# Patient Record
(demographics unavailable — no encounter records)

---

## 2024-11-14 NOTE — REASON FOR VISIT
[Structural Heart and Valve Disease] : structural heart and valve disease [FreeTextEntry3] : Sim [FreeTextEntry1] : Mr. Shirley is referred for evaluation of his aortic stenosis. He is an 82y/o male with a medical history significant for HTN, HLD, and CAD s/p CABGx5 on 11/6/2015 by Dr. Barba. He had an echocardiogram in January which showed moderate AS. Of note, EKG on 12/21/2023 demonstrated bifascicular block. Followed and referred by Dr. Montemayor.

## 2024-11-18 NOTE — REASON FOR VISIT
[FreeTextEntry2] : lower back pain after playing golf in [facility] on 11/1/24

## 2024-11-18 NOTE — ASSESSMENT
[FreeTextEntry1] : 83 yo male presents today for eval of his low back pain. CT from [place] reviewed shows compression fractures as well as spondylolisthesis L4-5. Patient has had numerous medications with minimal relief. Recommend proceeding with MRI as patient will need injection given persistent pain and likely stenosis.   - Patient given prescription for MRI, follow up after study is completed to discuss results.   - Referral to pain management for lumbar ALEXEY, Dr. Anthony.   - Recommend NSAIDs PRN - Recommend heating pad use to decrease muscle spasm - Discussed the importance of home exercises, including but not limited to hamstring stretching and core strengthening   Patient was educated on their diagnosis today. All questions answered and patient expressed understanding.  F/U after MRI

## 2024-11-18 NOTE — HISTORY OF PRESENT ILLNESS
[de-identified] : Body Part: lower back Length of symptoms/ DOI: 11/1/24 Cause of accident/ injury: playing golf in [facility] Radicular symptoms: none Quality of pain: sharp, stabbing, aching, spasms Pain at Rest:  7-10/10 Pain with activity/ walking:  10/10 Pain worsens with: sitting, standing, walking, sleeping, exercising Pain improves with: N/A Previous treatments: [facility] 11/5/24- Prednisone and Methocarbamol 500mg, After Hours clinic-Baclofen 20mg [facility]- IV Dilaudid and Morphine [facility] 11/8/24-Hydrocodone- Acetaminophen Returned to [facility] 11/11/24- Depo-Metro 40mg injection at L[facility] 11/17/24- Valium 5mg and Tylenol 1,000mg Recent Imaging: x-ray and CT in [facility], has reports only  Current treatments: TENs unit Current medications for pain: Valium 5mg and Tylenol 1,000mg Work status/ occupation: retired Assisted walking device: none

## 2024-11-22 NOTE — PHYSICAL EXAM
[Well Developed] : well developed [Well Nourished] : well nourished [No Acute Distress] : no acute distress [Normal Conjunctiva] : normal conjunctiva [Normal Venous Pressure] : normal venous pressure [No Carotid Bruit] : no carotid bruit [Normal S1, S2] : normal S1, S2 [No Rub] : no rub [No Gallop] : no gallop [Clear Lung Fields] : clear lung fields [Good Air Entry] : good air entry [No Respiratory Distress] : no respiratory distress  [Soft] : abdomen soft [Non Tender] : non-tender [No Masses/organomegaly] : no masses/organomegaly [Normal Bowel Sounds] : normal bowel sounds [Normal Gait] : normal gait [No Edema] : no edema [No Cyanosis] : no cyanosis [No Clubbing] : no clubbing [No Varicosities] : no varicosities [No Rash] : no rash [No Skin Lesions] : no skin lesions [Moves all extremities] : moves all extremities [No Focal Deficits] : no focal deficits [Normal Speech] : normal speech [Alert and Oriented] : alert and oriented [Normal memory] : normal memory [de-identified] : 3/6 systolic murmur, mid peaking. Normal S2

## 2024-11-22 NOTE — DISCUSSION/SUMMARY
[FreeTextEntry1] : CAD- No change in meds.  No angina  HTN- stable - c/w current meds Hchol- no change in meds  AV- mod-severe by exam.   Carlos remains stable from a cardiovascular perspective and may proceed with his planned kyphoplasty procedure. No additional testing is required. (Nuc stress test 3/24 without ischemia - ef 60%)   RTO in 4 mo  [EKG obtained to assist in diagnosis and management of assessed problem(s)] : EKG obtained to assist in diagnosis and management of assessed problem(s)

## 2024-11-22 NOTE — HISTORY OF PRESENT ILLNESS
[FreeTextEntry1] : Returning to the office for f/u  Planning on kyphoplasty Dealing with back pain No LE edema No syncope or near syncope No CP  Echo from [place] with LOAN 0.8 (very different from LOAN 1.1 earlier this year)

## 2024-11-22 NOTE — PHYSICAL EXAM
[Well Developed] : well developed [Well Nourished] : well nourished [No Acute Distress] : no acute distress [Normal Conjunctiva] : normal conjunctiva [Normal Venous Pressure] : normal venous pressure [No Carotid Bruit] : no carotid bruit [Normal S1, S2] : normal S1, S2 [No Rub] : no rub [No Gallop] : no gallop [Clear Lung Fields] : clear lung fields [Good Air Entry] : good air entry [No Respiratory Distress] : no respiratory distress  [Soft] : abdomen soft [Non Tender] : non-tender [No Masses/organomegaly] : no masses/organomegaly [Normal Bowel Sounds] : normal bowel sounds [Normal Gait] : normal gait [No Edema] : no edema [No Cyanosis] : no cyanosis [No Clubbing] : no clubbing [No Varicosities] : no varicosities [No Rash] : no rash [No Skin Lesions] : no skin lesions [Moves all extremities] : moves all extremities [No Focal Deficits] : no focal deficits [Normal Speech] : normal speech [Alert and Oriented] : alert and oriented [Normal memory] : normal memory [de-identified] : 3/6 systolic murmur, mid peaking. Normal S2

## 2024-12-02 NOTE — PHYSICAL EXAM
[No Acute Distress] : no acute distress [Well Nourished] : well nourished [Well Developed] : well developed [Normal Appearance] : was normal in appearance [Neck Supple] : was supple [No Respiratory Distress] : no respiratory distress  [Clear to Auscultation] : lungs were clear to auscultation bilaterally [Normal Rate] : normal rate  [Regular Rhythm] : with a regular rhythm [Normal S1, S2] : normal S1 and S2 [Alert and Oriented x3] : oriented to person, place, and time [de-identified] : + systolic murmur  [de-identified] : + sore to sacral region with surrounding erythema

## 2024-12-02 NOTE — HISTORY OF PRESENT ILLNESS
[Spouse] : spouse [FreeTextEntry1] : Follow up  [de-identified] : 82 year old male presents for a follow up. He developed sudden back pain about 4 and 1/2 weeks while golfing in [place]. He had been evaluated in [place] but ultimately came to [place] for treatment. He had been diagnosed with L1 compression fracture- had kyphoplasty on 11/26. He followed up with pain management today as he had developed pain to his left gluteal region. He received 2 injections today- to his sacral iliac joint and a left cluneal nerve block. He has developed a sore to his sacral region- was noticed about 2 weeks ago. His significant other has been attending to the sore- applying antibiotic ointment.   He has h/o aortic stenosis, HTN, Hyperlipidemia, CAD s/p CABG  following with cardiology  on ASA, Plavix, Rosuvastatin, Zetia, Nebivolol, Lisinopril   Has CMML, anemia- following with hematology/oncology  Has GERD- on Pantoprazole

## 2024-12-09 NOTE — PHYSICAL EXAM
[Alert and Oriented x3] : oriented to person, place, and time [No Acute Distress] : no acute distress [Well Developed] : well developed [Normal Appearance] : was normal in appearance [Neck Supple] : was supple [No Respiratory Distress] : no respiratory distress  [No Accessory Muscle Use] : no accessory muscle use [de-identified] : + tenderness to left groin, no palpable mass  [de-identified] : + sore to sacral region with surrounding erythema

## 2024-12-09 NOTE — HISTORY OF PRESENT ILLNESS
[FreeTextEntry8] : 82 year old male presents alongside his significant other c/o worsening left sided groin pain. He states the pain worsened after he received the injections by pain management for his lower back pain. He c/o pain to his left lower back and left groin. He denies any palpable lump to his groin. He is currently prescribed Oxycodone for his pain which has not been helping. He has been tried on Tizanidine and Baclofen previously w/o improvement. He has h/o BPH- concerned this could be contributing to his symptoms. He has h/o urinary retention but he has been urinating, denies any testicular swelling. He would like a referral for a urologist. He also continues to have a sore to his sacral region- has been taking Cephalexin, having Desitin cream applied to area. His significant other reports improvement to the area.

## 2024-12-09 NOTE — REVIEW OF SYSTEMS
[Fever] : no fever [Chills] : no chills [Chest Pain] : no chest pain [FreeTextEntry9] : as per HPI  [de-identified] : as per HPI

## 2024-12-09 NOTE — ASSESSMENT
[FreeTextEntry1] : trial of Diazepam at bedtime,  reviewed, aware of drowsy side effects hold Oxycodone- discuss pain with pain management referred to urology- has h/o BPH, c/o lower urinary stream as pressure ulcer still present with surrounding erythema, changed antibiotics to Doxycycline, take with food, c/w Desitin cream, if no improvement, would recommend patient see a wound care specialist

## 2024-12-13 NOTE — HISTORY OF PRESENT ILLNESS
[de-identified] : 12/09/2024:  82 Y M presenting today for an initial evaluation. Injury on 11/01/24 after hitting golf balls, resulted in compression FX of L1, seen at [facility] by [place] [facility], given muscle relaxers w/o relief.  1 week afterwards could not get up from couch, seen at [place] ED. No relief with pain medications. Discharged. A few days afterwards c/o severe back pains. Returned to [place] on 11/16/24. Seen by Dr. Presley, referred to pain mgmt, proceeded with kyphoplasty with Dr. Chapa. 2 days of relief post-op. No new injury. Pt c/o significant lower back pains few days after procedure. Given SIJ and nerve block w/o relief.  Hx of significant LT sided back pain in November 2023 but ultimately, he did improve.  No pain, numbness, tingling or weakness in the lower extremities b/l. No neck pains.  PSHx: quintuple bypass 2015, did not heal properly.  Chief complaint is LT sided lower thoracic pain wrapping around to front of trunk with numbness in abdomen.  The patient is a 82 year male who presents today complaining of low back pain radiating to left hip Date of Injury/Onset: 11/01/2024 Pain:    At Rest: 4-5/10  With Activity:  10/10 at times Mechanism of injury: playing golf Quality of symptoms: radiating, sharp, stabbing Improves with: Oxycodone Worse with: walking Prior treatment: SIJ injection, nerve block 12/2/24, Oxycodone 5 mg: Dr Anthony Prior Imaging: xray of left hip at [facility], Stand up mri ES Additional Information: compression fracture at L1 had a Kyphoplasty

## 2024-12-13 NOTE — PHYSICAL EXAM
[Biceps 2+] : biceps 2+ [Triceps 2+] : triceps 2+ [Brachioradialis 2+] : brachioradialis 2+ [4___] : left plantor flexors 4[unfilled]/5 [de-identified] : Constitutional: - General Appearance: Unremarkable Body Habitus Well Developed Well Nourished Body Habitus No Deformities Well Groomed Ability To communicate: Normal Neurologic: Global sensation is intact to upper and lower extremities. See examination of Neck and/or Spine for exceptions. Orientation to Time, Place and Person is: Normal Mood And Affect is Normal Skin: - Head/Face, Right Upper/Lower Extremity, Left Upper/Lower Extremity: Normal See Examination of Neck and/or Spine for exceptions Cardiovascular: Peripheral Cardiovascular System is Normal Palpation of Lymph Nodes: Normal Palpation of lymph nodes in: Axilla, Cervical, Inguinal Abnormal Palpation of lymph nodes in: None  [] : non-antalgic [FreeTextEntry3] : increase curvature in lower thoracic spine. L sided paraspinal needle site.  [FreeTextEntry9] : not tested.  [de-identified] : LT knee flexion 4/5.  [de-identified] : hyper-flexes in BL LE. paresthesia's in T12 LT distribution.

## 2024-12-13 NOTE — DISCUSSION/SUMMARY
[de-identified] : T12/ L1 radiculopathy, hyper-reflexia in BL LE, XRs taken in office today revealed L3 superior endplate compression deformity with a 50% loss of height, L1 s/p kyphoplasty demonstrates extravasation of cement into L soft tissue and possible spinal canal, difficult to differentiate if weakness in LLE is pain mediated or from root compression/ thermal injury from cement exothermic reaction. I am requesting a lumbar CT scan and a lumbar MRI with and w/o contrast to evaluate for nerve compression VS extravasation.  I am prescribing Lyrica to aid in nerve pains.   Patient will obtain CBC w DIFF ESR CRP, BUN/CR to rule out any infection.   F/U as soon as images are complete.   Prior to appointment and during encounter with patient extensive medical records were reviewed including but not limited to, hospital records, out patient records, imaging results, and lab data. During this appointment the patient was examined, diagnoses were discussed and explained in a face to face manner. In addition extensive time was spent reviewing aforementioned diagnostic studies. Counseling including abnormal image results, differential diagnoses, treatment options, risk and benefits, lifestyle changes, current condition, and current medications was performed. Patient's comments, questions, and concerns were address and patient verbalized understanding. Based on this patient's presentation at our office, which is an orthopedic spine surgeon's office, this patient inherently / intrinsically has a risk, however minute, of developing issues such as Cauda equina syndrome, bowel and bladder changes, or progression of motor or neurological deficits such as paralysis which may be permanent.     I, Petrona Ferrari, attest that this documentation has been prepared under the direction and in the presence of provider Aris Reyes MD.

## 2024-12-13 NOTE — HISTORY OF PRESENT ILLNESS
[de-identified] : 12/09/2024:  82 Y M presenting today for an initial evaluation. Injury on 11/01/24 after hitting golf balls, resulted in compression FX of L1, seen at [facility] by [place] [facility], given muscle relaxers w/o relief.  1 week afterwards could not get up from couch, seen at [place] ED. No relief with pain medications. Discharged. A few days afterwards c/o severe back pains. Returned to [place] on 11/16/24. Seen by Dr. Presley, referred to pain mgmt, proceeded with kyphoplasty with Dr. Chapa. 2 days of relief post-op. No new injury. Pt c/o significant lower back pains few days after procedure. Given SIJ and nerve block w/o relief.  Hx of significant LT sided back pain in November 2023 but ultimately, he did improve.  No pain, numbness, tingling or weakness in the lower extremities b/l. No neck pains.  PSHx: quintuple bypass 2015, did not heal properly.  Chief complaint is LT sided lower thoracic pain wrapping around to front of trunk with numbness in abdomen.  The patient is a 82 year male who presents today complaining of low back pain radiating to left hip Date of Injury/Onset: 11/01/2024 Pain:    At Rest: 4-5/10  With Activity:  10/10 at times Mechanism of injury: playing golf Quality of symptoms: radiating, sharp, stabbing Improves with: Oxycodone Worse with: walking Prior treatment: SIJ injection, nerve block 12/2/24, Oxycodone 5 mg: Dr Anthony Prior Imaging: xray of left hip at [facility], Stand up mri ES Additional Information: compression fracture at L1 had a Kyphoplasty

## 2024-12-13 NOTE — HISTORY OF PRESENT ILLNESS
[de-identified] : 12/09/2024:  82 Y M presenting today for an initial evaluation. Injury on 11/01/24 after hitting golf balls, resulted in compression FX of L1, seen at [facility] by [place] [facility], given muscle relaxers w/o relief.  1 week afterwards could not get up from couch, seen at [place] ED. No relief with pain medications. Discharged. A few days afterwards c/o severe back pains. Returned to [place] on 11/16/24. Seen by Dr. Presley, referred to pain mgmt, proceeded with kyphoplasty with Dr. Chapa. 2 days of relief post-op. No new injury. Pt c/o significant lower back pains few days after procedure. Given SIJ and nerve block w/o relief.  Hx of significant LT sided back pain in November 2023 but ultimately, he did improve.  No pain, numbness, tingling or weakness in the lower extremities b/l. No neck pains.  PSHx: quintuple bypass 2015, did not heal properly.  Chief complaint is LT sided lower thoracic pain wrapping around to front of trunk with numbness in abdomen.  The patient is a 82 year male who presents today complaining of low back pain radiating to left hip Date of Injury/Onset: 11/01/2024 Pain:    At Rest: 4-5/10  With Activity:  10/10 at times Mechanism of injury: playing golf Quality of symptoms: radiating, sharp, stabbing Improves with: Oxycodone Worse with: walking Prior treatment: SIJ injection, nerve block 12/2/24, Oxycodone 5 mg: Dr Anthony Prior Imaging: xray of left hip at [facility], Stand up mri ES Additional Information: compression fracture at L1 had a Kyphoplasty

## 2024-12-13 NOTE — PHYSICAL EXAM
[Biceps 2+] : biceps 2+ [Triceps 2+] : triceps 2+ [Brachioradialis 2+] : brachioradialis 2+ [4___] : left plantor flexors 4[unfilled]/5 [de-identified] : Constitutional: - General Appearance: Unremarkable Body Habitus Well Developed Well Nourished Body Habitus No Deformities Well Groomed Ability To communicate: Normal Neurologic: Global sensation is intact to upper and lower extremities. See examination of Neck and/or Spine for exceptions. Orientation to Time, Place and Person is: Normal Mood And Affect is Normal Skin: - Head/Face, Right Upper/Lower Extremity, Left Upper/Lower Extremity: Normal See Examination of Neck and/or Spine for exceptions Cardiovascular: Peripheral Cardiovascular System is Normal Palpation of Lymph Nodes: Normal Palpation of lymph nodes in: Axilla, Cervical, Inguinal Abnormal Palpation of lymph nodes in: None  [] : non-antalgic [FreeTextEntry3] : increase curvature in lower thoracic spine. L sided paraspinal needle site.  [FreeTextEntry9] : not tested.  [de-identified] : LT knee flexion 4/5.  [de-identified] : hyper-flexes in BL LE. paresthesia's in T12 LT distribution.

## 2024-12-13 NOTE — DATA REVIEWED
[FreeTextEntry1] : On my interpretations of these images from [facility] in [place] on 12/09/2024: I have independently reviewed and interpreted these images. 2 V lumbar XR demonstrates L4-5 listhesis, L3 superior endplate compression deformity with 15% loss of height, L1 s/p kyphoplasty demonstrates extravasation of cement into L soft tissue and possible spinal canal.   On my interpretations of these images from [facility] in [place] on 12/09/2024: I have independently reviewed and interpreted these images. 2 V thoracic XR- multilevel from T5- T11, adv DDD and ankylosis, no evidence for additional FXs.   11/18/24 stand up MRI- on my personal review: L1 and L3 demonstrate edema and compression deformity consistent with recent compression FX. Radiologist agrees.  L5-S1: adv DDD, mod FS.  L4-5: grade 1 listhesis, severe bl fs, R>L mod-severe lateral recess stenosis.  L3-4: mild-mod DDD, no stenosis.  L2-3: mild FS.  L1-2: nl T12-L1: nl   I have independently reviewed and interpreted these images and reports. [facility] on 12/6/24 HIP XRs- mild arthritis, NL   09/24/24 CT abdomen: compression FX L3 present.   12/18/2021 CT abdomen arthritic changes in L4-5, no compression FXs present.

## 2024-12-13 NOTE — DISCUSSION/SUMMARY
[de-identified] : T12/ L1 radiculopathy, hyper-reflexia in BL LE, XRs taken in office today revealed L3 superior endplate compression deformity with a 50% loss of height, L1 s/p kyphoplasty demonstrates extravasation of cement into L soft tissue and possible spinal canal, difficult to differentiate if weakness in LLE is pain mediated or from root compression/ thermal injury from cement exothermic reaction. I am requesting a lumbar CT scan and a lumbar MRI with and w/o contrast to evaluate for nerve compression VS extravasation.  I am prescribing Lyrica to aid in nerve pains.   Patient will obtain CBC w DIFF ESR CRP, BUN/CR to rule out any infection.   F/U as soon as images are complete.   Prior to appointment and during encounter with patient extensive medical records were reviewed including but not limited to, hospital records, out patient records, imaging results, and lab data. During this appointment the patient was examined, diagnoses were discussed and explained in a face to face manner. In addition extensive time was spent reviewing aforementioned diagnostic studies. Counseling including abnormal image results, differential diagnoses, treatment options, risk and benefits, lifestyle changes, current condition, and current medications was performed. Patient's comments, questions, and concerns were address and patient verbalized understanding. Based on this patient's presentation at our office, which is an orthopedic spine surgeon's office, this patient inherently / intrinsically has a risk, however minute, of developing issues such as Cauda equina syndrome, bowel and bladder changes, or progression of motor or neurological deficits such as paralysis which may be permanent.     I, Petrona Ferrrai, attest that this documentation has been prepared under the direction and in the presence of provider Aris Reyes MD.

## 2024-12-13 NOTE — DISCUSSION/SUMMARY
[de-identified] : T12/ L1 radiculopathy, hyper-reflexia in BL LE, XRs taken in office today revealed L3 superior endplate compression deformity with a 50% loss of height, L1 s/p kyphoplasty demonstrates extravasation of cement into L soft tissue and possible spinal canal, difficult to differentiate if weakness in LLE is pain mediated or from root compression/ thermal injury from cement exothermic reaction. I am requesting a lumbar CT scan and a lumbar MRI with and w/o contrast to evaluate for nerve compression VS extravasation.  I am prescribing Lyrica to aid in nerve pains.   Patient will obtain CBC w DIFF ESR CRP, BUN/CR to rule out any infection.   F/U as soon as images are complete.   Prior to appointment and during encounter with patient extensive medical records were reviewed including but not limited to, hospital records, out patient records, imaging results, and lab data. During this appointment the patient was examined, diagnoses were discussed and explained in a face to face manner. In addition extensive time was spent reviewing aforementioned diagnostic studies. Counseling including abnormal image results, differential diagnoses, treatment options, risk and benefits, lifestyle changes, current condition, and current medications was performed. Patient's comments, questions, and concerns were address and patient verbalized understanding. Based on this patient's presentation at our office, which is an orthopedic spine surgeon's office, this patient inherently / intrinsically has a risk, however minute, of developing issues such as Cauda equina syndrome, bowel and bladder changes, or progression of motor or neurological deficits such as paralysis which may be permanent.     I, Petrona Ferrari, attest that this documentation has been prepared under the direction and in the presence of provider Aris Reyes MD.

## 2024-12-13 NOTE — DATA REVIEWED
[FreeTextEntry1] : On my interpretations of these images from [facility] in [place] on 12/09/2024: I have independently reviewed and interpreted these images. 2 V lumbar XR demonstrates L4-5 listhesis, L3 superior endplate compression deformity with 15% loss of height, L1 s/p kyphoplasty demonstrates extravasation of cement into L soft tissue and possible spinal canal.   On my interpretations of these images from [facility] in [place] on 12/09/2024: I have independently reviewed and interpreted these images. 2 V thoracic XR- multilevel from T5- T11, adv DDD and ankylosis, no evidence for additional FXs.   11/18/24 stand up MRI- on my personal review: L1 and L3 demonstrate edema and compression deformity consistent with recent compression FX. Radiologist agrees.  L5-S1: adv DDD, mod FS.  L4-5: grade 1 listhesis, severe bl fs, R>L mod-severe lateral recess stenosis.  L3-4: mild-mod DDD, no stenosis.  L2-3: mild FS.  L1-2: nl T12-L1: nl   I have independently reviewed and interpreted these images and reports. NW on 12/6/24 HIP XRs- mild arthritis, NL   09/24/24 CT abdomen: compression FX L3 present.   12/18/2021 CT abdomen arthritic changes in L4-5, no compression FXs present.

## 2024-12-13 NOTE — DATA REVIEWED
[FreeTextEntry1] : On my interpretations of these images from [facility] in [place] on 12/09/2024: I have independently reviewed and interpreted these images. 2 V lumbar XR demonstrates L4-5 listhesis, L3 superior endplate compression deformity with 15% loss of height, L1 s/p kyphoplasty demonstrates extravasation of cement into L soft tissue and possible spinal canal.   On my interpretations of these images from [facility] in [place] on 12/09/2024: I have independently reviewed and interpreted these images. 2 V thoracic XR- multilevel from T5- T11, adv DDD and ankylosis, no evidence for additional FXs.   11/18/24 stand up MRI- on my personal review: L1 and L3 demonstrate edema and compression deformity consistent with recent compression FX. Radiologist agrees.  L5-S1: adv DDD, mod FS.  L4-5: grade 1 listhesis, severe bl fs, R>L mod-severe lateral recess stenosis.  L3-4: mild-mod DDD, no stenosis.  L2-3: mild FS.  L1-2: nl T12-L1: nl   I have independently reviewed and interpreted these images and reports. NW on 12/6/24 HIP XRs- mild arthritis, NL   09/24/24 CT abdomen: compression FX L3 present.   12/18/2021 CT abdomen arthritic changes in L4-5, no compression FXs present.

## 2024-12-13 NOTE — PHYSICAL EXAM
[Biceps 2+] : biceps 2+ [Triceps 2+] : triceps 2+ [Brachioradialis 2+] : brachioradialis 2+ [4___] : left plantor flexors 4[unfilled]/5 [de-identified] : Constitutional: - General Appearance: Unremarkable Body Habitus Well Developed Well Nourished Body Habitus No Deformities Well Groomed Ability To communicate: Normal Neurologic: Global sensation is intact to upper and lower extremities. See examination of Neck and/or Spine for exceptions. Orientation to Time, Place and Person is: Normal Mood And Affect is Normal Skin: - Head/Face, Right Upper/Lower Extremity, Left Upper/Lower Extremity: Normal See Examination of Neck and/or Spine for exceptions Cardiovascular: Peripheral Cardiovascular System is Normal Palpation of Lymph Nodes: Normal Palpation of lymph nodes in: Axilla, Cervical, Inguinal Abnormal Palpation of lymph nodes in: None  [] : non-antalgic [FreeTextEntry3] : increase curvature in lower thoracic spine. L sided paraspinal needle site.  [FreeTextEntry9] : not tested.  [de-identified] : LT knee flexion 4/5.  [de-identified] : hyper-flexes in BL LE. paresthesia's in T12 LT distribution.

## 2024-12-26 NOTE — PROCEDURE
[FreeTextEntry1] : NIOX  < 5  12/26/24  normalized  prior 26 ppb  PFT  12/26/24  moderate reduction flow  rtaes very mild OAD  pattern  TLC 58 % pred  DLCO 40 %  IMP  Moderate restrictive  ventilatory impairment with severe gas  exchange impairment mild  decline    05/07/2024 Pulmonary function testing There is a moderate ventilatory impairment in a restrictive pattern There is moderate reduction in lung volume. There is elevation in the RV/TLC ratio indicative of possible air trapping. There is a mild diffusion impairment. Corrects to normal with lung volume correction  Technically limited study.

## 2024-12-26 NOTE — HISTORY OF PRESENT ILLNESS
[FreeTextEntry1] : Mr. Shirley returns for follow up evaluation of aortic stenosis. He was last seen here by Dr. Fair in April, at which time his AS was found to be moderate. He was instructed to follow up in 6 months for continued monitoring. In the interim he had an echocardiogram in [place] which reportedly showed his AS to be severe. Briefly, he is an 82y/o male with a medical history significant for HTN, HLD, and CAD s/p CABGx5 on 2015 by Dr. Barba.  He is accompanied today by his wife. His primary complaint is that of back pain, which limits his activities. He does not have dyspnea on exertion, chest discomfort or syncope. He had the flu about 2 weeks ago and is recovering from that. No hospitalizations.  Echocardiogram was done today and shows the followin. Left ventricular systolic function is normal with an ejection fraction of 64 % by Gillette's method of disks. There are no regional wall motion abnormalities seen.  2. There is mild (grade 1) left ventricular diastolic dysfunction, with normal left ventricular filling pressure.  3. Normal right ventricular cavity size and normal right ventricular systolic function.  4. The right atrium is dilated.  5. Left atrium is mildly dilated.  6. Moderate aortic stenosis.  7. Mild mitral regurgitation.  8. Mild tricuspid regurgitation.  9. Estimated pulmonary artery systolic pressure is 37 mmHg, consistent with elevated pulmonary artery pressure. 10. Compared to the transthoracic echocardiogram performed on 2024, there have been no significant interval changes.  NYHA I

## 2024-12-26 NOTE — REVIEW OF SYSTEMS
[Dyspnea on exertion] : dyspnea during exertion [Negative] : Psychiatric [Fever] : no fever [Chills] : no chills [Feeling Fatigued] : not feeling fatigued [Lower Ext Edema] : no extremity edema [Orthopnea] : no orthopnea [Syncope] : no syncope

## 2024-12-26 NOTE — ASSESSMENT
[FreeTextEntry1] : Mr. Shirley has moderate aortic stenosis with no attributable symptoms. His lifestyle is limited by his back pain. - echocardiogram was done today and shows his AS to still be moderate - we discussed the TAVR procedure and diagnostic testing ([facility], cardiac CTA) in detail - I discussed at length, the EXPAND II clinical trial  -- due to lack of symptoms, they are not interested in pursuing the trial at this time - I will make no medication changes today as he is stable on bystolic and lisinopril - he will continue his asa and plavix as well as crestor - he is going to [place] and will return in April, at which time he will see Dr. Perales and have a repeat echo

## 2024-12-26 NOTE — DISCUSSION/SUMMARY
[FreeTextEntry1] : post Influenza A  chest  congestion with wheeze  completed 5 days prednisone 40 mg x 5  days  unable to use MDI  PFT  with  Moderate restrictive  ventilatory impairment with severe gas  exchange impairment change to Dry powdered  with Advair 250 50 1  puff BID  and Rinse instructions detailed

## 2024-12-26 NOTE — PHYSICAL EXAM
[No Acute Distress] : no acute distress [Normal Oropharynx] : normal oropharynx [Normal Appearance] : normal appearance [No Neck Mass] : no neck mass [Normal Rate/Rhythm] : normal rate/rhythm [Murmur ___ / 6] : murmur [unfilled] / 6 [Normal S1, S2] : normal s1, s2 [No Resp Distress] : no resp distress [Kyphosis] : kyphosis [Benign] : benign [Normal Gait] : normal gait [No Clubbing] : no clubbing [No Cyanosis] : no cyanosis [No Edema] : no edema [FROM] : FROM [Normal Color/ Pigmentation] : normal color/ pigmentation [No Focal Deficits] : no focal deficits [Oriented x3] : oriented x3 [Normal Affect] : normal affect [TextBox_68] : b/l coarse BS

## 2024-12-26 NOTE — PHYSICAL EXAM
[Normal Rate] : normal [Rhythm Regular] : regular [Normal S1] : normal S1 [Normal S2] : normal S2 [III] : a grade 3 [IV] : a grade 4 [No Pitting Edema] : no pitting edema present [2+] : left 2+ [Normal] : alert and oriented, normal memory

## 2024-12-26 NOTE — HISTORY OF PRESENT ILLNESS
[Never] : never [TextBox_4] : LEWIS RIOS is a 82 year old  M referred for pulmonary evaluation for SOB. s/p dx Influenza A 8  days  prior  pos  wheeze prednisone 20 mg BID x 5  days wheeze x 4 days unable to  coordinate use  of  medrol  Reviewed CT and discussed with radiology. No significant fibrosis or interstitial process. Addendum done to report. Mild bronchiectasis and small area of groundglass opacity. Will follow clinically and radiographically.  On meds for nasal issues.  Symptoms more in nose and sinuses. Saw ENT told rhinitis. Positive chest pressure and some ARREDONDO.  Saw cardiologist. Told Ok Saw GI had endoscopy put on Gaviscon and Pantoprazole.  Feels someone blowing up baloon in chest. Some cough has improved with treatment. No wheeze.   Past pulmonary history. N Occupational Exposure. N Family history of pulmonary disease. N Recent travel N Pets Dog not allergic.   S/P CABG 2015 and 2 subsequent for wires breaking.   No imaging.

## 2024-12-29 NOTE — PHYSICAL EXAM
[Biceps 2+] : biceps 2+ [Triceps 2+] : triceps 2+ [Brachioradialis 2+] : brachioradialis 2+ [4___] : left plantor flexors 4[unfilled]/5 [de-identified] : Constitutional: - General Appearance: Unremarkable Body Habitus Well Developed Well Nourished Body Habitus No Deformities Well Groomed Ability To communicate: Normal Neurologic: Global sensation is intact to upper and lower extremities. See examination of Neck and/or Spine for exceptions. Orientation to Time, Place and Person is: Normal Mood And Affect is Normal Skin: - Head/Face, Right Upper/Lower Extremity, Left Upper/Lower Extremity: Normal See Examination of Neck and/or Spine for exceptions Cardiovascular: Peripheral Cardiovascular System is Normal Palpation of Lymph Nodes: Normal Palpation of lymph nodes in: Axilla, Cervical, Inguinal Abnormal Palpation of lymph nodes in: None  [] : non-antalgic [FreeTextEntry3] : increase curvature in lower thoracic spine. L sided paraspinal needle site.  [FreeTextEntry9] : not tested.  [de-identified] : LT knee flexion 4/5.  [de-identified] : hyper-flexes in BL LE. paresthesia's in T12 LT distribution.

## 2024-12-29 NOTE — DATA REVIEWED
[CBC] : CBC [Laboratory studies were reviewed and noted in the chart] : Laboratory studies were reviewed and noted in the chart [FreeTextEntry1] : On my interpretation of these images from [facility] on 12/18/24. I have additionally reviewed the radiologist report. MRI-  L5-S1: adv DDD, mod-adv facet degeneration, mild stenosis, mod FS.  L4-5: grade 1 listhesis, adv facet degeneration, severe stenosis, lateral recess and foramen L3-4: mild-mod DDD, mild stenosis, L3 chronic superior compression deformity.  L2-3: mild stenosis, mod DDD.  L1-2: No stenosis. L1 vertebrae s/p kyphoplasty, does not appear to be any cement in spinal canal. Midline posterior wall protrusion causes mild central stenosis w/o compression T12-L1:  mod DDD no stenosis.  Contrast images are not demonstrating any specific enhancements nor are there any collections consistent with an abscess.   CT NW 12/11/24: s/p L1 kyphoplasty, where there has been cement fill of the anterior superior L1 vertebral body, extending into central portion of vertebral body on LT, cement tail extending into soft tissues posterior to the spine, does not appear to be any cement in spinal canal or in foraminal space or in lateral space.  Small amount of cement just to the L1 transverse process, superior to the transverse process but it is dorsal.   On my interpretations of these images from [facility] in [place] on 12/09/2024: I have independently reviewed and interpreted these images. 2 V lumbar XR demonstrates L4-5 listhesis, L3 superior endplate compression deformity with 15% loss of height, L1 s/p kyphoplasty demonstrates extravasation of cement into L soft tissue and possible spinal canal.   On my interpretations of these images from [facility] in [place] on 12/09/2024: I have independently reviewed and interpreted these images. 2 V thoracic XR- multilevel from T5- T11, adv DDD and ankylosis, no evidence for additional FXs.   11/18/24 stand up MRI- on my personal review: L1 and L3 demonstrate edema and compression deformity consistent with recent compression FX. Radiologist agrees.  L5-S1: adv DDD, mod FS.  L4-5: grade 1 listhesis, severe bl fs, R>L mod-severe lateral recess stenosis.  L3-4: mild-mod DDD, no stenosis.  L2-3: mild FS.  L1-2: nl T12-L1: nl   I have independently reviewed and interpreted these images and reports. NW on 12/6/24 HIP XRs- mild arthritis, NL   09/24/24 CT abdomen: compression FX L3 present.   12/18/2021 CT abdomen arthritic changes in L4-5, no compression FXs present.

## 2024-12-29 NOTE — DISCUSSION/SUMMARY
[de-identified] : 82 Y M w/ L3 chronic superior compression deformity, s/p L1 kyphoplasty, lumbar spondylosis.  No severe amount of nerve compression revealed from MRI. CT scan revealed a small amount of cement just to the L1 transverse process, superior to the transverse process but it is dorsal. Follow up with pain mgmt for injection therapies.  Patient was educated and informed on their condition along with the expected outcomes.  Advised pt to follow up PCP to eval abdomen pains.  No specific concern with bloodwork as pt did take MDP after surgery and was feeling sick prior to blood work- WBC 13.8, neutrophils are 80%, NL is 77%, CRP 1.4.   Moving forward I'd like to see as needed.   Prior to appointment and during encounter with patient extensive medical records were reviewed including but not limited to, hospital records, out patient records, imaging results, and lab data. During this appointment the patient was examined, diagnoses were discussed and explained in a face to face manner. In addition extensive time was spent reviewing aforementioned diagnostic studies. Counseling including abnormal image results, differential diagnoses, treatment options, risk and benefits, lifestyle changes, current condition, and current medications was performed. Patient's comments, questions, and concerns were address and patient verbalized understanding. Based on this patient's presentation at our office, which is an orthopedic spine surgeon's office, this patient inherently / intrinsically has a risk, however minute, of developing issues such as Cauda equina syndrome, bowel and bladder changes, or progression of motor or neurological deficits such as paralysis which may be permanent.     I, Petrona Ferrari, attest that this documentation has been prepared under the direction and in the presence of provider Aris Reyes MD.

## 2024-12-29 NOTE — PHYSICAL EXAM
[Biceps 2+] : biceps 2+ [Triceps 2+] : triceps 2+ [Brachioradialis 2+] : brachioradialis 2+ [4___] : left plantor flexors 4[unfilled]/5 [de-identified] : Constitutional: - General Appearance: Unremarkable Body Habitus Well Developed Well Nourished Body Habitus No Deformities Well Groomed Ability To communicate: Normal Neurologic: Global sensation is intact to upper and lower extremities. See examination of Neck and/or Spine for exceptions. Orientation to Time, Place and Person is: Normal Mood And Affect is Normal Skin: - Head/Face, Right Upper/Lower Extremity, Left Upper/Lower Extremity: Normal See Examination of Neck and/or Spine for exceptions Cardiovascular: Peripheral Cardiovascular System is Normal Palpation of Lymph Nodes: Normal Palpation of lymph nodes in: Axilla, Cervical, Inguinal Abnormal Palpation of lymph nodes in: None  [] : non-antalgic [FreeTextEntry3] : increase curvature in lower thoracic spine. L sided paraspinal needle site.  [FreeTextEntry9] : not tested.  [de-identified] : LT knee flexion 4/5.  [de-identified] : hyper-flexes in BL LE. paresthesia's in T12 LT distribution.

## 2024-12-29 NOTE — HISTORY OF PRESENT ILLNESS
[de-identified] : 12/23/24: Patient presenting today for an CT and MRI results review.  He reports mild improvement in symptoms. Seen by pain mgmt 12/10/24, discussed injection therapies. C/o severe pains in abdomen.   12/09/2024:  82 Y M presenting today for an initial evaluation. Injury on 11/01/24 after hitting golf balls, resulted in compression FX of L1, seen at [facility] by [place] [facility], given muscle relaxers w/o relief.  1 week afterwards could not get up from couch, seen at [place] ED. No relief with pain medications. Discharged. A few days afterwards c/o severe back pains. Returned to [place] on 11/16/24. Seen by Dr. Presley, referred to pain [facility], proceeded with kyphoplasty with Dr. Chapa. 2 days of relief post-op. No new injury. Pt c/o significant lower back pains few days after procedure. Given SIJ and nerve block w/o relief.  Hx of significant LT sided back pain in November 2023 but ultimately, he did improve.  No pain, numbness, tingling or weakness in the lower extremities b/l. No neck pains.  PSHx: quintuple bypass 2015, did not heal properly.  Chief complaint is LT sided lower thoracic pain wrapping around to front of trunk with numbness in abdomen.  The patient is a 82 year male who presents today complaining of low back pain radiating to left hip Date of Injury/Onset: 11/01/2024 Pain:    At Rest: 4-5/10  With Activity:  10/10 at times Mechanism of injury: playing golf Quality of symptoms: radiating, sharp, stabbing Improves with: Oxycodone Worse with: walking Prior treatment: SIJ injection, nerve block 12/2/24, Oxycodone 5 mg: Dr Anthony Prior Imaging: xray of left hip at [facility], [facility] mri ES Additional Information: compression fracture at L1 had a Kyphoplasty

## 2024-12-29 NOTE — DISCUSSION/SUMMARY
[de-identified] : 82 Y M w/ L3 chronic superior compression deformity, s/p L1 kyphoplasty, lumbar spondylosis.  No severe amount of nerve compression revealed from MRI. CT scan revealed a small amount of cement just to the L1 transverse process, superior to the transverse process but it is dorsal. Follow up with pain mgmt for injection therapies.  Patient was educated and informed on their condition along with the expected outcomes.  Advised pt to follow up PCP to eval abdomen pains.  No specific concern with bloodwork as pt did take MDP after surgery and was feeling sick prior to blood work- WBC 13.8, neutrophils are 80%, NL is 77%, CRP 1.4.   Moving forward I'd like to see as needed.   Prior to appointment and during encounter with patient extensive medical records were reviewed including but not limited to, hospital records, out patient records, imaging results, and lab data. During this appointment the patient was examined, diagnoses were discussed and explained in a face to face manner. In addition extensive time was spent reviewing aforementioned diagnostic studies. Counseling including abnormal image results, differential diagnoses, treatment options, risk and benefits, lifestyle changes, current condition, and current medications was performed. Patient's comments, questions, and concerns were address and patient verbalized understanding. Based on this patient's presentation at our office, which is an orthopedic spine surgeon's office, this patient inherently / intrinsically has a risk, however minute, of developing issues such as Cauda equina syndrome, bowel and bladder changes, or progression of motor or neurological deficits such as paralysis which may be permanent.     I, Petrona Ferrari, attest that this documentation has been prepared under the direction and in the presence of provider Aris Reyes MD.

## 2024-12-29 NOTE — DATA REVIEWED
[CBC] : CBC [Laboratory studies were reviewed and noted in the chart] : Laboratory studies were reviewed and noted in the chart [FreeTextEntry1] : On my interpretation of these images from [facility] on 12/18/24. I have additionally reviewed the radiologist report. MRI-  L5-S1: adv DDD, mod-adv facet degeneration, mild stenosis, mod FS.  L4-5: grade 1 listhesis, adv facet degeneration, severe stenosis, lateral recess and foramen L3-4: mild-mod DDD, mild stenosis, L3 chronic superior compression deformity.  L2-3: mild stenosis, mod DDD.  L1-2: No stenosis. L1 vertebrae s/p kyphoplasty, does not appear to be any cement in spinal canal. Midline posterior wall protrusion causes mild central stenosis w/o compression T12-L1:  mod DDD no stenosis.  Contrast images are not demonstrating any specific enhancements nor are there any collections consistent with an abscess.   CT NW 12/11/24: s/p L1 kyphoplasty, where there has been cement fill of the anterior superior L1 vertebral body, extending into central portion of vertebral body on LT, cement tail extending into soft tissues posterior to the spine, does not appear to be any cement in spinal canal or in foraminal space or in lateral space.  Small amount of cement just to the L1 transverse process, superior to the transverse process but it is dorsal.   On my interpretations of these images from [facility] in [place] on 12/09/2024: I have independently reviewed and interpreted these images. 2 V lumbar XR demonstrates L4-5 listhesis, L3 superior endplate compression deformity with 15% loss of height, L1 s/p kyphoplasty demonstrates extravasation of cement into L soft tissue and possible spinal canal.   On my interpretations of these images from [facility] in [place] on 12/09/2024: I have independently reviewed and interpreted these images. 2 V thoracic XR- multilevel from T5- T11, adv DDD and ankylosis, no evidence for additional FXs.   11/18/24 stand up MRI- on my personal review: L1 and L3 demonstrate edema and compression deformity consistent with recent compression FX. Radiologist agrees.  L5-S1: adv DDD, mod FS.  L4-5: grade 1 listhesis, severe bl fs, R>L mod-severe lateral recess stenosis.  L3-4: mild-mod DDD, no stenosis.  L2-3: mild FS.  L1-2: nl T12-L1: nl   I have independently reviewed and interpreted these images and reports. NW on 12/6/24 HIP XRs- mild arthritis, NL   09/24/24 CT abdomen: compression FX L3 present.   12/18/2021 CT abdomen arthritic changes in L4-5, no compression FXs present.

## 2024-12-29 NOTE — HISTORY OF PRESENT ILLNESS
[de-identified] : 12/23/24: Patient presenting today for an CT and MRI results review.  He reports mild improvement in symptoms. Seen by pain mgmt 12/10/24, discussed injection therapies. C/o severe pains in abdomen.   12/09/2024:  82 Y M presenting today for an initial evaluation. Injury on 11/01/24 after hitting golf balls, resulted in compression FX of L1, seen at [facility] by [facility], given muscle relaxers w/o relief.  1 week afterwards could not get up from couch, seen at [place] ED. No relief with pain medications. Discharged. A few days afterwards c/o severe back pains. Returned to [place] on 11/16/24. Seen by Dr. Presley, referred to pain [facility], proceeded with kyphoplasty with Dr. Chapa. 2 days of relief post-op. No new injury. Pt c/o significant lower back pains few days after procedure. Given SIJ and nerve block w/o relief.  Hx of significant LT sided back pain in November 2023 but ultimately, he did improve.  No pain, numbness, tingling or weakness in the lower extremities b/l. No neck pains.  PSHx: quintuple bypass 2015, did not heal properly.  Chief complaint is LT sided lower thoracic pain wrapping around to front of trunk with numbness in abdomen.  The patient is a 82 year male who presents today complaining of low back pain radiating to left hip Date of Injury/Onset: 11/01/2024 Pain:    At Rest: 4-5/10  With Activity:  10/10 at times Mechanism of injury: playing golf Quality of symptoms: radiating, sharp, stabbing Improves with: Oxycodone Worse with: walking Prior treatment: SIJ injection, nerve block 12/2/24, Oxycodone 5 mg: Dr Atnhony Prior Imaging: xray of left hip at [facility], Stand up mri ES Additional Information: compression fracture at L1 had a Kyphoplasty

## 2025-04-15 NOTE — PHYSICAL EXAM
[Well Developed] : well developed [Well Nourished] : well nourished [Normal Venous Pressure] : normal venous pressure [Normal Rate] : normal [Rhythm Regular] : regular [Normal S1] : normal S1 [Normal S2] : normal S2 [III] : a grade 3 [No Pitting Edema] : no pitting edema present [2+] : left 2+ [Normal] : alert and oriented, normal memory

## 2025-04-15 NOTE — REVIEW OF SYSTEMS
[Negative] : Psychiatric [Fever] : no fever [Chills] : no chills [Feeling Fatigued] : not feeling fatigued [Dyspnea on exertion] : not dyspnea during exertion [Chest Discomfort] : no chest discomfort [Lower Ext Edema] : no extremity edema [Syncope] : no syncope

## 2025-04-15 NOTE — HISTORY OF PRESENT ILLNESS
[FreeTextEntry1] : Mr. Shirley returns to clinic today for a follow up of his Aortic Stenosis. He was last seen in December; at which time his Aortic Stenosis was deemed to be moderate, and he was having no symptoms or lifestyle limiting issues. There was a discussion had with the patient regarding possible enrollment in the EXPAND II Clinical trial, however since he was having no symptoms, he was not interested at that time.  Today he presents accompanied by his wife. He feels largely the same as he did in December. His activities are restricted by back pain (for which he is having an ablation next week). He is participating in physical therapy, including walking upstairs, without any dyspnea or chest discomfort. He denies leg swelling, lightheadedness and syncope.     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 He is usually active and is a lifetime golfer, but has been sidelined due to a fractured vertebra (L3). He is planned for a nerve block ablation to address the back pain next week. He currently reports no fatigue, ARREDONDO, anaing, or synciope. He notes "the only thing I am dealing with right now is back pain" for which he is in PT twice a week--also without exertional symptoms attributable to AS. He notes "I go through it without a problem" with respect to both the outpateint PT and the sessions he does on his own at home. He had a prior CABG but no stents; he states he takes Plavix for a peripheral angioplasty of the LLE that he had in [place] for a non-healing ulcer on his toe.   NYHA Classification: STS 30 day Mortality Risk Score:

## 2025-04-15 NOTE — CARDIOLOGY SUMMARY
[de-identified] : 1226/24 CONCLUSIONS:  1. Left ventricular systolic function is normal with an ejection fraction of 64 % by Gillette's method of disks. There are no regional wall motion abnormalities seen.  2. There is mild (grade 1) left ventricular diastolic dysfunction, with normal left ventricular filling pressure.  3. Normal right ventricular cavity size and normal right ventricular systolic function.  4. The right atrium is dilated.  5. Left atrium is mildly dilated.  6. Moderate aortic stenosis.  7. Mild mitral regurgitation.  8. Mild tricuspid regurgitation.  9. Estimated pulmonary artery systolic pressure is 37 mmHg, consistent with elevated pulmonary artery pressure. 10. Compared to the transthoracic echocardiogram performed on 4/23/2024, there have been no significant interval changes.

## 2025-04-15 NOTE — REASON FOR VISIT
[Structural Heart and Valve Disease] : structural heart and valve disease [FreeTextEntry3] : Dr. Montemayor

## 2025-04-15 NOTE — CARDIOLOGY SUMMARY
[de-identified] : 1226/24 CONCLUSIONS:  1. Left ventricular systolic function is normal with an ejection fraction of 64 % by Gillette's method of disks. There are no regional wall motion abnormalities seen.  2. There is mild (grade 1) left ventricular diastolic dysfunction, with normal left ventricular filling pressure.  3. Normal right ventricular cavity size and normal right ventricular systolic function.  4. The right atrium is dilated.  5. Left atrium is mildly dilated.  6. Moderate aortic stenosis.  7. Mild mitral regurgitation.  8. Mild tricuspid regurgitation.  9. Estimated pulmonary artery systolic pressure is 37 mmHg, consistent with elevated pulmonary artery pressure. 10. Compared to the transthoracic echocardiogram performed on 4/23/2024, there have been no significant interval changes.

## 2025-04-18 NOTE — HISTORY OF PRESENT ILLNESS
[FreeTextEntry1] : Returning to the office for f/u  Planning back nerve ablation  No LE edema No syncope or near syncope No CP  Saw structural and planning on repeat echo later this year

## 2025-04-18 NOTE — DISCUSSION/SUMMARY
[FreeTextEntry1] : CAD- No change in meds.  No angina  HTN- stable - c/w current meds Hchol- no change in meds  AV- mod-severe - plan on repeat Echo in Fall and f/u with structural   RTO in 4 mo   [EKG obtained to assist in diagnosis and management of assessed problem(s)] : EKG obtained to assist in diagnosis and management of assessed problem(s)

## 2025-04-22 NOTE — REVIEW OF SYSTEMS
[Negative] : Heme/Lymph [Abdominal Pain] : no abdominal pain [Vomiting] : no vomiting [Constipation] : no constipation [FreeTextEntry9] : Back pain

## 2025-04-22 NOTE — HISTORY OF PRESENT ILLNESS
[de-identified] : Elevated ferritin- normal iron/transferrin saturation\par  CMML-0\par   [de-identified] : He is here with his wife.  He is still dealing with back pain from his compression fractures.  s/p kyphoplasty on 1/15.  Still following with pain management, planned for a nerve ablation on Tuesday.  Has some brief improvement with nerve block. Using tramadol minimally for pain.  He denies any bleeding.

## 2025-04-22 NOTE — ASSESSMENT
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  He has a number of issues including sternal wire dehiscence/nonclosure, chronic LE wound infections, tendon injury.   His bone marrow biopsy 9/2/2020- trilineage hematopoiesis, mild dyserythropoiesis, dysmegakaryopoiesis.  Cytogenetics with normal karyotype Normal MDS FISH NGS with ASXL1, SRSF2, U2AF2, ETV6, PHF6 mutations. CT 8/824 at [facility]- no lymphadenopathy/splenomegaly.  His Hg is stable, but he is more microcytic.  Concerned that he is either bleeding or not absorbing iron due to chronic PPI use.  He has had an elevated ferritin for years related to chronic inflammation from his sternal dehiscence/chronic wound infections.  His ferritin was still elevated but decreasing.  Given a course of venofer 800mg from 7/15-8/26/24.     ?etiology of his worsening anemia/thrombocytopenia-  Repeat bone marrow biopsy 10/10/24- Chronic  myelomonocytic leukemia-1 Hypercellular bone marrow with erythroid predominant trilineage hematopoiesis and trilineage dysplasia. Aspirate  smears show around 5% blasts/blast equivalents. Normal karyotype.  Normal MDS FISH. Molecular studies- SRSF2 p.[id number], U2AF1 p.[id number], ASXL1 p.[id number], ETV6, [id number] ATRX p.?, RUNX1 p.[id number]  His peripheral smear with adequate platelets, significant microcytosis/poikiolocytosis.  ?valvular issues.  Echo with severe AS/calcification but no hemolysis on labs.  Manual count is >100.   His Hg is worsening- Hg 8.6.  Repeat epo level- plan to begin retacrit/procrit 40,000 units weekly.  He will have a dose Monday prior to leaving for [place].  Reached out to [facility] in [place] to set up patient with hematology there to continue treatment.   He will continue follow up with pain management for his back pain/compression fractures. Refill of tramadol.  Following with cardiology for mod-severe AS.

## 2025-04-22 NOTE — ASSESSMENT
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  He has a number of issues including sternal wire dehiscence/nonclosure, chronic LE wound infections, tendon injury.   His bone marrow biopsy 9/2/2020- trilineage hematopoiesis, mild dyserythropoiesis, dysmegakaryopoiesis.  Cytogenetics with normal karyotype Normal MDS FISH NGS with ASXL1, SRSF2, U2AF2, ETV6, PHF6 mutations. CT 8/824 at [facility]- no lymphadenopathy/splenomegaly.  His Hg is stable, but he is more microcytic.  Concerned that he is either bleeding or not absorbing iron due to chronic PPI use.  He has had an elevated ferritin for years related to chronic inflammation from his sternal dehiscence/chronic wound infections.  His ferritin was still elevated but decreasing.  Given a course of venofer 800mg from 7/15-8/26/24.     ?etiology of his worsening anemia/thrombocytopenia-  Repeat bone marrow biopsy 10/10/24- Chronic  myelomonocytic leukemia-1 Hypercellular bone marrow with erythroid predominant trilineage hematopoiesis and trilineage dysplasia. Aspirate  smears show around 5% blasts/blast equivalents. Normal karyotype.  Normal MDS FISH. Molecular studies- SRSF2 p.[id number], U2AF1 p.[id number], ASXL1 p.[id number], ETV6, [id number] ATRX p.?, RUNX1 p.[id number]  His peripheral smear with adequate platelets, significant microcytosis/poikiolocytosis.  ?valvular issues.  Echo with severe AS/calcification but no hemolysis on labs.  Manual count is >100.   His Hg is worsening- Hg 8.6.  Repeat epo level- plan to begin retacrit/procrit 40,000 units weekly.  He will have a dose Monday prior to leaving for [place].  Reached out to [facility] in [place] to set up patient with hematology there to continue treatment.   He will continue follow up with pain management for his back pain/compression fractures. Refill of tramadol.  Following with cardiology for mod-severe AS.

## 2025-04-22 NOTE — PHYSICAL EXAM
[Fully active, able to carry on all pre-disease performance without restriction] : Status 0 - Fully active, able to carry on all pre-disease performance without restriction [Normal] : affect appropriate [de-identified] : no palpable masses, no inguinal lymphadenopathy.

## 2025-04-22 NOTE — HISTORY OF PRESENT ILLNESS
[de-identified] : Elevated ferritin- normal iron/transferrin saturation\par  CMML-0\par   [de-identified] : He is here with his wife.  He is still dealing with back pain from his compression fractures.  s/p kyphoplasty on 1/15.  Still following with pain management, planned for a nerve ablation on Tuesday.  Has some brief improvement with nerve block. Using tramadol minimally for pain.  He denies any bleeding.

## 2025-04-22 NOTE — PHYSICAL EXAM
[Fully active, able to carry on all pre-disease performance without restriction] : Status 0 - Fully active, able to carry on all pre-disease performance without restriction [Normal] : affect appropriate [de-identified] : no palpable masses, no inguinal lymphadenopathy.

## 2025-07-03 NOTE — PHYSICAL EXAM
[Fully active, able to carry on all pre-disease performance without restriction] : Status 0 - Fully active, able to carry on all pre-disease performance without restriction [Normal] : affect appropriate [de-identified] : no palpable masses, no inguinal lymphadenopathy.

## 2025-07-03 NOTE — ASSESSMENT
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  He has a number of issues including sternal wire dehiscence/nonclosure, chronic LE wound infections, tendon injury.   His bone marrow biopsy 9/2/2020- trilineage hematopoiesis, mild dyserythropoiesis, dysmegakaryopoiesis.  Cytogenetics with normal karyotype Normal MDS FISH NGS with ASXL1, SRSF2, U2AF2, ETV6, PHF6 mutations. CT 8/824 at [facility]- no lymphadenopathy/splenomegaly.  His Hg is stable, but he is more microcytic.  Concerned that he is either bleeding or not absorbing iron due to chronic PPI use.  He has had an elevated ferritin for years related to chronic inflammation from his sternal dehiscence/chronic wound infections.  His ferritin was still elevated but decreasing.  Given a course of venofer 800mg from 7/15-8/26/24.     ?etiology of his worsening anemia/thrombocytopenia-  Repeat bone marrow biopsy 10/10/24- Chronic  myelomonocytic leukemia-1 Hypercellular bone marrow with erythroid predominant trilineage hematopoiesis and trilineage dysplasia. Aspirate  smears show around 5% blasts/blast equivalents. Normal karyotype.  Normal MDS FISH. Molecular studies- SRSF2 p.[id number], U2AF1 p.[id number], ASXL1 p.[id number], ETV6, [id number] ATRX p.?, RUNX1 p.[id number]  His peripheral smear with adequate platelets, significant microcytosis/poikiolocytosis.  ?valvular issues.  Echo with severe AS/calcification but no hemolysis on labs.  Manual count is >100.   His Hg is worsening- Hg 8.6.  Repeat epo level- plan to begin retacrit/procrit 40,000 units weekly.  He will have a dose Monday prior to leaving for [place].  Reached out to [facility] in [place] to set up patient with hematology there to continue treatment.   He will continue follow up with pain management for his back pain/compression fractures. Refill of tramadol.  Following with cardiology for mod-severe AS.

## 2025-07-03 NOTE — REVIEW OF SYSTEMS
[Abdominal Pain] : no abdominal pain [Vomiting] : no vomiting [Constipation] : no constipation [Negative] : Heme/Lymph [FreeTextEntry9] : Back pain

## 2025-07-03 NOTE — HISTORY OF PRESENT ILLNESS
[de-identified] : Elevated ferritin- normal iron/transferrin saturation\par  CMML-0\par   [de-identified] : He is here with his wife.  He is still dealing with back pain from his compression fractures.   He was planned for a   procedure which was canceled due to plt of 77.  He denies any bleeding.   s/p kyphoplasty on 1/15.  Still following with pain management, planned for a nerve ablation on Tuesday.  Has some brief improvement with nerve block. Using tramadol minimally for pain.  He denies any bleeding.

## 2025-07-24 NOTE — PHYSICAL EXAM
[Well Developed] : well developed [Normal Rate] : normal [Rhythm Regular] : regular [III] : a grade 3 [No Pitting Edema] : no pitting edema present [Clear Lung Fields] : clear lung fields [Soft] : abdomen soft [Non Tender] : non-tender [Normal Gait] : normal gait [No Edema] : no edema [Normal] : alert and oriented, normal memory

## 2025-07-28 NOTE — CARDIOLOGY SUMMARY
[de-identified] : 4/15/25 normal LV size and function, moderate to severe AS (peak/mean gradients 44/25 mmHg, peak velocity 3.3m/s, DVI 0.27, and an LOAN of 0.94 cm2), mild MR, and a PASP of 47mmHg.

## 2025-07-28 NOTE — HISTORY OF PRESENT ILLNESS
[FreeTextEntry1] : Mr. Shirley returns for follow up evaluation of aortic stenosis. He was last seen here by Dr. Perales in April, at which time his AS was found to be in the moderate/severe range. He was suffering from severe back pain for which he was awaiting an ablation.  He was asymptomatic from a CV perspective, advised to follow up with us in 3-4 months for repeat assessment. Briefly, he is an 84 y/o male with a medical history significant for HTN, HLD, and CAD s/p CABGx5 on 11/6/2015 by Dr. Barba. His spinal ablation was ultimately cancelled due to thrombocytopenia. He has CMML and is being followed by hematology/oncology.   He is accompanied today by his wife. His primary complaint is that of back pain, which limits his activities. He does not have dyspnea on exertion, chest discomfort or syncope. He is awaiting intracept procedure for spinal stenosis, it was cancelled in June due to thrombocytopenia, he has since been evaluated by his hematologist Dr. Kim and anticipates being rescheduled in the near future.   Repeat echocardiogram was done today, results are pending.

## 2025-07-28 NOTE — CARDIOLOGY SUMMARY
[de-identified] : 4/15/25 normal LV size and function, moderate to severe AS (peak/mean gradients 44/25 mmHg, peak velocity 3.3m/s, DVI 0.27, and an LOAN of 0.94 cm2), mild MR, and a PASP of 47mmHg.

## 2025-07-28 NOTE — ADDENDUM
[FreeTextEntry1] : TTE results reviewed with Dr. Mccrary, Mr. Shirley's aortic stenosis remains in the moderate range, not yet warranting intervention. Discussed results with patient this morning, he will return to valve clinic in November with repeat TTE, or sooner should symptoms develop.

## 2025-07-28 NOTE — REVIEW OF SYSTEMS
[Feeling Fatigued] : feeling fatigued [Negative] : Heme/Lymph [Dyspnea on exertion] : not dyspnea during exertion [Chest Discomfort] : no chest discomfort [Lower Ext Edema] : no extremity edema [Palpitations] : no palpitations [Orthopnea] : no orthopnea [PND] : no PND [Abdominal Pain] : no abdominal pain [Change in Appetite] : no change in appetite [Dizziness] : no dizziness [FreeTextEntry9] : see HPI

## 2025-07-28 NOTE — ASSESSMENT
[FreeTextEntry1] : Mr. Shirley returns to clinic today for follow up evaluation of aortic stenosis. He is asymptomatic from a cardiovascular perspective. His activity though is significantly impacted by his chronic back pain. He is able to get through his day-to-day activities without dyspnea or angina. He underwent repeat echocardiogram today, he will call our office on Monday to review the results and plan. We discussed that if AS remains in the moderate range, we will continue to monitor, if severe will discuss moving forward with testing including cardiac CT and cardiac catheterization.

## 2025-07-28 NOTE — HISTORY OF PRESENT ILLNESS
[FreeTextEntry1] : Mr. Shirley returns for follow up evaluation of aortic stenosis. He was last seen here by Dr. Perales in April, at which time his AS was found to be in the moderate/severe range. He was suffering from severe back pain for which he was awaiting an ablation.  He was asymptomatic from a CV perspective, advised to follow up with us in 3-4 months for repeat assessment. Briefly, he is an 82 y/o male with a medical history significant for HTN, HLD, and CAD s/p CABGx5 on 11/6/2015 by Dr. Barba. His spinal ablation was ultimately cancelled due to thrombocytopenia. He has CMML and is being followed by hematology/oncology.   He is accompanied today by his wife. His primary complaint is that of back pain, which limits his activities. He does not have dyspnea on exertion, chest discomfort or syncope. He is awaiting intracept procedure for spinal stenosis, it was cancelled in June due to thrombocytopenia, he has since been evaluated by his hematologist Dr. Kim and anticipates being rescheduled in the near future.   Repeat echocardiogram was done today, results are pending.

## 2025-07-28 NOTE — CARDIOLOGY SUMMARY
[de-identified] : 4/15/25 normal LV size and function, moderate to severe AS (peak/mean gradients 44/25 mmHg, peak velocity 3.3m/s, DVI 0.27, and an LOAN of 0.94 cm2), mild MR, and a PASP of 47mmHg.